# Patient Record
Sex: MALE | Race: BLACK OR AFRICAN AMERICAN | NOT HISPANIC OR LATINO | URBAN - METROPOLITAN AREA
[De-identification: names, ages, dates, MRNs, and addresses within clinical notes are randomized per-mention and may not be internally consistent; named-entity substitution may affect disease eponyms.]

---

## 2022-07-07 ENCOUNTER — INPATIENT (INPATIENT)
Facility: HOSPITAL | Age: 37
LOS: 2 days | Discharge: LEFT BEFORE DISCHARGE | DRG: 975 | End: 2022-07-10
Attending: INTERNAL MEDICINE | Admitting: STUDENT IN AN ORGANIZED HEALTH CARE EDUCATION/TRAINING PROGRAM
Payer: MEDICAID

## 2022-07-07 VITALS
OXYGEN SATURATION: 98 % | RESPIRATION RATE: 18 BRPM | SYSTOLIC BLOOD PRESSURE: 97 MMHG | HEART RATE: 101 BPM | TEMPERATURE: 103 F | DIASTOLIC BLOOD PRESSURE: 40 MMHG

## 2022-07-07 PROCEDURE — 99285 EMERGENCY DEPT VISIT HI MDM: CPT

## 2022-07-07 RX ORDER — SODIUM CHLORIDE 9 MG/ML
2200 INJECTION INTRAMUSCULAR; INTRAVENOUS; SUBCUTANEOUS ONCE
Refills: 0 | Status: COMPLETED | OUTPATIENT
Start: 2022-07-07 | End: 2022-07-07

## 2022-07-07 RX ORDER — CEFTRIAXONE 500 MG/1
1000 INJECTION, POWDER, FOR SOLUTION INTRAMUSCULAR; INTRAVENOUS ONCE
Refills: 0 | Status: COMPLETED | OUTPATIENT
Start: 2022-07-07 | End: 2022-07-07

## 2022-07-07 RX ORDER — VANCOMYCIN HCL 1 G
1000 VIAL (EA) INTRAVENOUS ONCE
Refills: 0 | Status: COMPLETED | OUTPATIENT
Start: 2022-07-07 | End: 2022-07-07

## 2022-07-07 NOTE — ED ADULT TRIAGE NOTE - CHIEF COMPLAINT QUOTE
pt with lower abdominal  pain worsening x 1 week with belching and dry heaving. received 4mg zofran by EMS. hx of HIV

## 2022-07-07 NOTE — ED PROVIDER NOTE - NS_ ATTENDINGSCRIBEDETAILS _ED_A_ED_FT
Yanet Davis MD,  (Attending): The history, relevant review of systems, past medical and surgical history, medical decision making, and physical examination was documented by the scribe in my presence and I attest to the accuracy of the documentation. Yanet Davis MD,  (Attending): The history, relevant review of systems, past medical and surgical history, medical decision making, and physical examination was documented by the scribe in my presence and I attest to the accuracy of the documentation..

## 2022-07-07 NOTE — ED PROVIDER NOTE - PHYSICAL EXAMINATION
***GEN - NAD; well appearing; A+O x3   ***HEAD - NC/AT   ***EYES/NOSE - PEERL, EOMI, mucous membranes moist, no discharge   ***THROAT: Oral cavity and pharynx normal. No inflammation, swelling, exudate, or lesions.    ***NECK: Neck supple, non-tender without lymphadenopathy, no masses, no thyromegaly.   ***PULMONARY - CTA b/l, symmetric breath sounds.   ***CARDIAC -s1s2, RRR, no M,G,R  ***ABDOMEN - +BS, ND, soft, (+)abdomen diffusely tender. No guarding, no rebound, no masses   ***BACK - (+)b/l CVA tenderness, Normal  spine   ***EXTREMITIES - symmetric pulses, 2+ dp, capillary refill < 2 seconds, no clubbing, no cyanosis, no edema   ***SKIN - no rash or bruising. (+)Warm to touch.   ***NEUROLOGIC - alert, CN 2-12 intact, reflexes nl, sensation nl, coordination nl, finger to nose nl, romberg negative, motor 5/5 RUE/LUE/RLE/LLE/EHL/Plantar flexion, no pronator drift, gait nl,   ***PSYCH - insight and judgment nl, memory nl, affect nl, thought nl

## 2022-07-07 NOTE — ED PROVIDER NOTE - OBJECTIVE STATEMENT
35 y/o male with a PMHx of HIV, presents to the ED c/o abdominal pain and dysuria for 1 week. Pt states he had similar symptoms previously. Reports decreased PO intake. Denies vomiting, diarrhea, cough, sore throat, or runny nose. 37 y/o male with a PMHx of HIV, presents to the ED c/o abdominal pain and dysuria for 1 week. Pt states he previously had similar symptoms. Reports decreased PO intake. Denies vomiting, diarrhea, cough, sore throat, or runny nose. 35 y/o male with a PMHx of HIV, presents to the ED c/o abdominal pain and dysuria for 1 week. Pt states he previously had similar symptoms. Reports decreased PO intake. Denies vomiting, diarrhea, cough, sore throat, or runny nose. Allergy to Penicillin.

## 2022-07-08 DIAGNOSIS — R10.9 UNSPECIFIED ABDOMINAL PAIN: ICD-10-CM

## 2022-07-08 LAB
4/8 RATIO: 0.3 RATIO — LOW (ref 0.9–3.6)
ABS CD8: 755 /UL — HIGH (ref 142–740)
ALBUMIN SERPL ELPH-MCNC: 2.8 G/DL — LOW (ref 3.3–5.2)
ALP SERPL-CCNC: 130 U/L — HIGH (ref 40–120)
ALT FLD-CCNC: 94 U/L — HIGH
AMPHET UR-MCNC: POSITIVE
ANION GAP SERPL CALC-SCNC: 12 MMOL/L — SIGNIFICANT CHANGE UP (ref 5–17)
APPEARANCE UR: CLEAR — SIGNIFICANT CHANGE UP
APTT BLD: 30.3 SEC — SIGNIFICANT CHANGE UP (ref 27.5–35.5)
AST SERPL-CCNC: 121 U/L — HIGH
BARBITURATES UR SCN-MCNC: NEGATIVE — SIGNIFICANT CHANGE UP
BASOPHILS # BLD AUTO: 0.03 K/UL — SIGNIFICANT CHANGE UP (ref 0–0.2)
BASOPHILS NFR BLD AUTO: 0.2 % — SIGNIFICANT CHANGE UP (ref 0–2)
BENZODIAZ UR-MCNC: NEGATIVE — SIGNIFICANT CHANGE UP
BILIRUB SERPL-MCNC: <0.2 MG/DL — LOW (ref 0.4–2)
BILIRUB UR-MCNC: NEGATIVE — SIGNIFICANT CHANGE UP
BUN SERPL-MCNC: 16.9 MG/DL — SIGNIFICANT CHANGE UP (ref 8–20)
CALCIUM SERPL-MCNC: 8.3 MG/DL — LOW (ref 8.6–10.2)
CD3 BLASTS SPEC-ACNC: 1033 /UL — SIGNIFICANT CHANGE UP (ref 672–1870)
CD3 BLASTS SPEC-ACNC: 61 % — SIGNIFICANT CHANGE UP (ref 59–83)
CD4 %: 13 % — LOW (ref 30–62)
CD8 %: 44 % — HIGH (ref 12–36)
CHLORIDE SERPL-SCNC: 97 MMOL/L — LOW (ref 98–107)
CO2 SERPL-SCNC: 22 MMOL/L — SIGNIFICANT CHANGE UP (ref 22–29)
COCAINE METAB.OTHER UR-MCNC: NEGATIVE — SIGNIFICANT CHANGE UP
COLOR SPEC: YELLOW — SIGNIFICANT CHANGE UP
CREAT SERPL-MCNC: 1.17 MG/DL — SIGNIFICANT CHANGE UP (ref 0.5–1.3)
DIFF PNL FLD: ABNORMAL
EGFR: 84 ML/MIN/1.73M2 — SIGNIFICANT CHANGE UP
EOSINOPHIL # BLD AUTO: 0.01 K/UL — SIGNIFICANT CHANGE UP (ref 0–0.5)
EOSINOPHIL NFR BLD AUTO: 0.1 % — SIGNIFICANT CHANGE UP (ref 0–6)
FLUAV AG NPH QL: SIGNIFICANT CHANGE UP
FLUBV AG NPH QL: SIGNIFICANT CHANGE UP
GAS PNL BLDV: SIGNIFICANT CHANGE UP
GAS PNL BLDV: SIGNIFICANT CHANGE UP
GLUCOSE SERPL-MCNC: 110 MG/DL — HIGH (ref 70–99)
GLUCOSE UR QL: NEGATIVE MG/DL — SIGNIFICANT CHANGE UP
HCT VFR BLD CALC: 29.9 % — LOW (ref 39–50)
HGB BLD-MCNC: 9.5 G/DL — LOW (ref 13–17)
IMM GRANULOCYTES NFR BLD AUTO: 0.3 % — SIGNIFICANT CHANGE UP (ref 0–1.5)
INR BLD: 1.19 RATIO — HIGH (ref 0.88–1.16)
KETONES UR-MCNC: NEGATIVE — SIGNIFICANT CHANGE UP
LEUKOCYTE ESTERASE UR-ACNC: ABNORMAL
LYMPHOCYTES # BLD AUTO: 16.4 % — SIGNIFICANT CHANGE UP (ref 13–44)
LYMPHOCYTES # BLD AUTO: 2.05 K/UL — SIGNIFICANT CHANGE UP (ref 1–3.3)
MCHC RBC-ENTMCNC: 26.8 PG — LOW (ref 27–34)
MCHC RBC-ENTMCNC: 31.8 GM/DL — LOW (ref 32–36)
MCV RBC AUTO: 84.5 FL — SIGNIFICANT CHANGE UP (ref 80–100)
METHADONE UR-MCNC: NEGATIVE — SIGNIFICANT CHANGE UP
MONOCYTES # BLD AUTO: 1.32 K/UL — HIGH (ref 0–0.9)
MONOCYTES NFR BLD AUTO: 10.6 % — SIGNIFICANT CHANGE UP (ref 2–14)
NEUTROPHILS # BLD AUTO: 9.05 K/UL — HIGH (ref 1.8–7.4)
NEUTROPHILS NFR BLD AUTO: 72.4 % — SIGNIFICANT CHANGE UP (ref 43–77)
NITRITE UR-MCNC: NEGATIVE — SIGNIFICANT CHANGE UP
OPIATES UR-MCNC: NEGATIVE — SIGNIFICANT CHANGE UP
PCP SPEC-MCNC: SIGNIFICANT CHANGE UP
PCP UR-MCNC: NEGATIVE — SIGNIFICANT CHANGE UP
PH UR: 7 — SIGNIFICANT CHANGE UP (ref 5–8)
PLATELET # BLD AUTO: 362 K/UL — SIGNIFICANT CHANGE UP (ref 150–400)
POTASSIUM SERPL-MCNC: 4.2 MMOL/L — SIGNIFICANT CHANGE UP (ref 3.5–5.3)
POTASSIUM SERPL-SCNC: 4.2 MMOL/L — SIGNIFICANT CHANGE UP (ref 3.5–5.3)
PROT SERPL-MCNC: 7.2 G/DL — SIGNIFICANT CHANGE UP (ref 6.6–8.7)
PROT UR-MCNC: 15
PROTHROM AB SERPL-ACNC: 13.8 SEC — HIGH (ref 10.5–13.4)
RBC # BLD: 3.54 M/UL — LOW (ref 4.2–5.8)
RBC # FLD: 14.8 % — HIGH (ref 10.3–14.5)
RSV RNA NPH QL NAA+NON-PROBE: SIGNIFICANT CHANGE UP
SARS-COV-2 RNA SPEC QL NAA+PROBE: SIGNIFICANT CHANGE UP
SODIUM SERPL-SCNC: 130 MMOL/L — LOW (ref 135–145)
SP GR SPEC: 1.01 — SIGNIFICANT CHANGE UP (ref 1.01–1.02)
T-CELL CD4 SUBSET PNL BLD: 230 /UL — LOW (ref 489–1457)
THC UR QL: NEGATIVE — SIGNIFICANT CHANGE UP
UROBILINOGEN FLD QL: NEGATIVE MG/DL — SIGNIFICANT CHANGE UP
WBC # BLD: 12.5 K/UL — HIGH (ref 3.8–10.5)
WBC # FLD AUTO: 12.5 K/UL — HIGH (ref 3.8–10.5)

## 2022-07-08 PROCEDURE — 99223 1ST HOSP IP/OBS HIGH 75: CPT

## 2022-07-08 PROCEDURE — G1004: CPT

## 2022-07-08 PROCEDURE — 74177 CT ABD & PELVIS W/CONTRAST: CPT | Mod: 26,MG

## 2022-07-08 PROCEDURE — 93010 ELECTROCARDIOGRAM REPORT: CPT

## 2022-07-08 PROCEDURE — 76705 ECHO EXAM OF ABDOMEN: CPT | Mod: 26

## 2022-07-08 PROCEDURE — 71045 X-RAY EXAM CHEST 1 VIEW: CPT | Mod: 26

## 2022-07-08 RX ORDER — CEFTRIAXONE 500 MG/1
1000 INJECTION, POWDER, FOR SOLUTION INTRAMUSCULAR; INTRAVENOUS ONCE
Refills: 0 | Status: COMPLETED | OUTPATIENT
Start: 2022-07-08 | End: 2022-07-08

## 2022-07-08 RX ORDER — ATORVASTATIN CALCIUM 80 MG/1
1 TABLET, FILM COATED ORAL
Qty: 0 | Refills: 0 | DISCHARGE

## 2022-07-08 RX ORDER — ONDANSETRON 8 MG/1
4 TABLET, FILM COATED ORAL EVERY 8 HOURS
Refills: 0 | Status: DISCONTINUED | OUTPATIENT
Start: 2022-07-08 | End: 2022-07-10

## 2022-07-08 RX ORDER — CEFTRIAXONE 500 MG/1
1000 INJECTION, POWDER, FOR SOLUTION INTRAMUSCULAR; INTRAVENOUS EVERY 24 HOURS
Refills: 0 | Status: DISCONTINUED | OUTPATIENT
Start: 2022-07-08 | End: 2022-07-10

## 2022-07-08 RX ORDER — HYDROMORPHONE HYDROCHLORIDE 2 MG/ML
1 INJECTION INTRAMUSCULAR; INTRAVENOUS; SUBCUTANEOUS ONCE
Refills: 0 | Status: DISCONTINUED | OUTPATIENT
Start: 2022-07-08 | End: 2022-07-08

## 2022-07-08 RX ORDER — LANOLIN ALCOHOL/MO/W.PET/CERES
3 CREAM (GRAM) TOPICAL AT BEDTIME
Refills: 0 | Status: DISCONTINUED | OUTPATIENT
Start: 2022-07-08 | End: 2022-07-10

## 2022-07-08 RX ORDER — BICTEGRAVIR SODIUM, EMTRICITABINE, AND TENOFOVIR ALAFENAMIDE FUMARATE 30; 120; 15 MG/1; MG/1; MG/1
1 TABLET ORAL
Qty: 0 | Refills: 0 | DISCHARGE

## 2022-07-08 RX ORDER — ACETAMINOPHEN 500 MG
650 TABLET ORAL EVERY 6 HOURS
Refills: 0 | Status: DISCONTINUED | OUTPATIENT
Start: 2022-07-08 | End: 2022-07-10

## 2022-07-08 RX ORDER — BICTEGRAVIR SODIUM, EMTRICITABINE, AND TENOFOVIR ALAFENAMIDE FUMARATE 30; 120; 15 MG/1; MG/1; MG/1
1 TABLET ORAL DAILY
Refills: 0 | Status: DISCONTINUED | OUTPATIENT
Start: 2022-07-08 | End: 2022-07-10

## 2022-07-08 RX ORDER — HEPARIN SODIUM 5000 [USP'U]/ML
5000 INJECTION INTRAVENOUS; SUBCUTANEOUS EVERY 8 HOURS
Refills: 0 | Status: DISCONTINUED | OUTPATIENT
Start: 2022-07-08 | End: 2022-07-10

## 2022-07-08 RX ADMIN — BICTEGRAVIR SODIUM, EMTRICITABINE, AND TENOFOVIR ALAFENAMIDE FUMARATE 1 TABLET(S): 30; 120; 15 TABLET ORAL at 10:51

## 2022-07-08 RX ADMIN — CEFTRIAXONE 100 MILLIGRAM(S): 500 INJECTION, POWDER, FOR SOLUTION INTRAMUSCULAR; INTRAVENOUS at 09:25

## 2022-07-08 RX ADMIN — Medication 250 MILLIGRAM(S): at 05:03

## 2022-07-08 RX ADMIN — Medication 650 MILLIGRAM(S): at 17:35

## 2022-07-08 RX ADMIN — Medication 1000 MILLIGRAM(S): at 07:52

## 2022-07-08 RX ADMIN — HEPARIN SODIUM 5000 UNIT(S): 5000 INJECTION INTRAVENOUS; SUBCUTANEOUS at 23:20

## 2022-07-08 RX ADMIN — HYDROMORPHONE HYDROCHLORIDE 1 MILLIGRAM(S): 2 INJECTION INTRAMUSCULAR; INTRAVENOUS; SUBCUTANEOUS at 06:30

## 2022-07-08 RX ADMIN — HYDROMORPHONE HYDROCHLORIDE 1 MILLIGRAM(S): 2 INJECTION INTRAMUSCULAR; INTRAVENOUS; SUBCUTANEOUS at 03:55

## 2022-07-08 RX ADMIN — CEFTRIAXONE 1000 MILLIGRAM(S): 500 INJECTION, POWDER, FOR SOLUTION INTRAMUSCULAR; INTRAVENOUS at 10:51

## 2022-07-08 RX ADMIN — CEFTRIAXONE 100 MILLIGRAM(S): 500 INJECTION, POWDER, FOR SOLUTION INTRAMUSCULAR; INTRAVENOUS at 00:56

## 2022-07-08 RX ADMIN — CEFTRIAXONE 100 MILLIGRAM(S): 500 INJECTION, POWDER, FOR SOLUTION INTRAMUSCULAR; INTRAVENOUS at 11:53

## 2022-07-08 RX ADMIN — HYDROMORPHONE HYDROCHLORIDE 1 MILLIGRAM(S): 2 INJECTION INTRAMUSCULAR; INTRAVENOUS; SUBCUTANEOUS at 04:55

## 2022-07-08 RX ADMIN — SODIUM CHLORIDE 1100 MILLILITER(S): 9 INJECTION INTRAMUSCULAR; INTRAVENOUS; SUBCUTANEOUS at 00:57

## 2022-07-08 RX ADMIN — HYDROMORPHONE HYDROCHLORIDE 1 MILLIGRAM(S): 2 INJECTION INTRAMUSCULAR; INTRAVENOUS; SUBCUTANEOUS at 07:26

## 2022-07-08 NOTE — ED ADULT NURSE NOTE - OBJECTIVE STATEMENT
pt with lower abdominal  pain worsening x 1 week with belching and dry heaving. received 4mg zofran by EMS. hx of HIV.

## 2022-07-08 NOTE — H&P ADULT - VTE RISK ASSESSMENT
Problem: Risk for Impaired Skin Integrity  Goal: Tissue integrity - skin and mucous membranes  Description  Structural intactness and normal physiological function of skin and  mucous membranes.   Outcome: Ongoing     Problem: Falls - Risk of:  Goal: Will remain free from falls  Description  Will remain free from falls  2/10/2020 1125 by Yonathan Ventura RN  Outcome: Ongoing VTE Assessment already completed for this visit

## 2022-07-08 NOTE — ED ADULT NURSE NOTE - PATIENT IS UNABLE TO BE SCREENED DUE TO:
Health maintenance - flu vacc refused, Prevnar 2/16, PVX 11/17, tdap 7/12, rec hep A and shingrix - get at pharmacy; mammo 2/1/18 [facility]; no further Paps unless abnlities; DEXA 11/17, repeat 2020-21; colonsoc 2/14, repeat 2024 per Dr. Peoples; eye exam with Dr. Francis to be updated; dental exam q6mos; (+) seat belt use; needs annual skin check with Dr. Baer (with h/o BCC and recent SCC)    Consultants:  Patient Care Team:  Nicole Arana MD as PCP - General (Internal Medicine)  Alexa Muro PA as PCP - Claims Attributed  Richelle, Surinder KRISHNAMURTHY MD as Consulting Physician (Colon and Rectal Surgery)  Fransico Juarez MD as Consulting Physician (Obstetrics and Gynecology)  Surinder Fontaine MD as Consulting Physician (Infectious Diseases)  Fransico Kimble MD as Consulting Physician (Dermatology)  Chay Francis III, MD as Consulting Physician (Ophthalmology)  Kimberley Baer MD as Consulting Physician (Dermatology)  Sid Infante MD as Consulting Physician (Dermatology)       Acuity of illness

## 2022-07-08 NOTE — H&P ADULT - NSHPREVIEWOFSYSTEMS_GEN_ALL_CORE
Review of Systems:  CONSTITUTIONAL: + fevers and chills  EYES/ENT: No visual changes;  No vertigo or throat pain   NECK: No pain or stiffness  RESPIRATORY: No cough, wheezing, hemoptysis; No shortness of breath,   CARDIOVASCULAR: No chest pain or palpitations  GASTROINTESTINAL: No abdominal or epigastric pain. No nausea, vomiting, or hematemesis; No diarrhea or constipation.   GENITOURINARY: +Dysuria  NEUROLOGICAL: No numbness or weakness  SKIN: No itching, burning, rashes, or lesions   All other review of systems is negative unless indicated above

## 2022-07-08 NOTE — H&P ADULT - HISTORY OF PRESENT ILLNESS
37 yo male with hx of HIV presents with complaints of lower abdominal pain for the past week. Patient states that he has been at staying at [place] and started to develop abdominal pain. States to have subjective fevers and chills at home. Of note patient also reports some dysuria as well. Denies currently being sexually active. Denies any discharge. No other complaints.

## 2022-07-08 NOTE — H&P ADULT - ASSESSMENT
35 yo male with hx of HIV presents with complaints of lower abdominal pain for the past week. Patient states that he has been at staying at [place] and started to develop abdominal pain. States to have subjective fevers and chills at home. Of note patient also reports some dysuria as well. Denies currently being sexually active. Denies any discharge. No other complaints.    #Sepsis secondary to Pyelonephritis  CT scan results reviewed   UA positive  F/u UCX, Blood cx  Requested STD testing   Will send GC/CT and RPR  CXR appears negative    #HIV  On Biktaryv  Unaware of CD4/VL - Will order labs  States to be compliant with biktarvy    #Anemia  FOBT  Iron panel, b12, folate  TSH  Monitor    #Rectal wall thickening   CT abdomen with diffuse rectal wall thickening  Needs to follow up with GI outpatient for colonoscopy     #DVT prophylaxis: Heparin     37 yo male with hx of HIV presents with complaints of lower abdominal pain for the past week. Patient states that he has been at staying at [place] and started to develop abdominal pain. States to have subjective fevers and chills at home. Of note patient also reports some dysuria as well. Denies currently being sexually active. Denies any discharge. No other complaints.    #Sepsis secondary to Pyelonephritis  CT scan results reviewed   UA positive  F/u UCX, Blood cx  Requested STD testing   Will send GC/CT and RPR  CXR appears negative    #HIV  On Biktaryv  Unaware of CD4/VL - Will order labs  States to be compliant with biktarvy    #Anemia  FOBT  Iron panel, b12, folate  TSH  Monitor    #Rectal wall thickening   CT abdomen with diffuse rectal wall thickening  Needs to follow up with GI outpatient for colonoscopy     #Elevated LFTs  Will obtain RUQ US  Hepatitis panel    #DVT prophylaxis: Heparin

## 2022-07-08 NOTE — H&P ADULT - NSHPPHYSICALEXAM_GEN_ALL_CORE
PHYSICAL EXAM:  Vital Signs Last 24 Hrs  T(F): 99.2 (08 Jul 2022 04:42), Max: 103 (07 Jul 2022 22:03)  HR: 77 (08 Jul 2022 04:42) (77 - 101)  BP: 110/60 (08 Jul 2022 04:42) (97/40 - 110/60)  RR: 18 (07 Jul 2022 22:03) (18 - 18)  SpO2: 97% (08 Jul 2022 04:42) (97% - 98%)    GENERAL: NAD, Resting in bed  Eyes: EOMI, PERRLA  ENMT: Conjunctiva and sclera clear; supple neck, No JVD  Cardiovascular: S1,S2, RRR, No murmur  Respiratory: CTA B/L, Non-labored breathing  GI: Bowel sounds present; Soft, Nontender, Nondistended. No hepatomegaly  Genitourinary: Deferred  Skin:  no breakdowns, ulcers or discharge, No rashes or lesions  Neurology: Alert & Oriented X3, non-focal and spontaneous movements of all extremities, CN 2 to 12 grossly intact   Psych: Appropriate mood and affect, calm, pleasant, Responds appropriately to questions

## 2022-07-09 LAB
A1C WITH ESTIMATED AVERAGE GLUCOSE RESULT: 5.5 % — SIGNIFICANT CHANGE UP (ref 4–5.6)
ALBUMIN SERPL ELPH-MCNC: 2.9 G/DL — LOW (ref 3.3–5.2)
ALP SERPL-CCNC: 146 U/L — HIGH (ref 40–120)
ALT FLD-CCNC: 93 U/L — HIGH
ANION GAP SERPL CALC-SCNC: 14 MMOL/L — SIGNIFICANT CHANGE UP (ref 5–17)
AST SERPL-CCNC: 82 U/L — HIGH
BASOPHILS # BLD AUTO: 0.02 K/UL — SIGNIFICANT CHANGE UP (ref 0–0.2)
BASOPHILS NFR BLD AUTO: 0.2 % — SIGNIFICANT CHANGE UP (ref 0–2)
BILIRUB SERPL-MCNC: <0.2 MG/DL — LOW (ref 0.4–2)
BUN SERPL-MCNC: 10.4 MG/DL — SIGNIFICANT CHANGE UP (ref 8–20)
C TRACH RRNA SPEC QL NAA+PROBE: SIGNIFICANT CHANGE UP
C TRACH RRNA SPEC QL NAA+PROBE: SIGNIFICANT CHANGE UP
CALCIUM SERPL-MCNC: 8.3 MG/DL — LOW (ref 8.6–10.2)
CHLORIDE SERPL-SCNC: 100 MMOL/L — SIGNIFICANT CHANGE UP (ref 98–107)
CHOLEST SERPL-MCNC: 80 MG/DL — SIGNIFICANT CHANGE UP
CO2 SERPL-SCNC: 22 MMOL/L — SIGNIFICANT CHANGE UP (ref 22–29)
CREAT SERPL-MCNC: 0.94 MG/DL — SIGNIFICANT CHANGE UP (ref 0.5–1.3)
E COLI DNA BLD POS QL NAA+NON-PROBE: SIGNIFICANT CHANGE UP
EGFR: 109 ML/MIN/1.73M2 — SIGNIFICANT CHANGE UP
EOSINOPHIL # BLD AUTO: 0.05 K/UL — SIGNIFICANT CHANGE UP (ref 0–0.5)
EOSINOPHIL NFR BLD AUTO: 0.4 % — SIGNIFICANT CHANGE UP (ref 0–6)
ESTIMATED AVERAGE GLUCOSE: 111 MG/DL — SIGNIFICANT CHANGE UP (ref 68–114)
FERRITIN SERPL-MCNC: 303 NG/ML — SIGNIFICANT CHANGE UP (ref 30–400)
FOLATE SERPL-MCNC: 8.7 NG/ML — SIGNIFICANT CHANGE UP
GLUCOSE SERPL-MCNC: 126 MG/DL — HIGH (ref 70–99)
GRAM STN FLD: SIGNIFICANT CHANGE UP
HCT VFR BLD CALC: 33.8 % — LOW (ref 39–50)
HDLC SERPL-MCNC: 19 MG/DL — LOW
HGB BLD-MCNC: 10.8 G/DL — LOW (ref 13–17)
HIV-1 VIRAL LOAD RESULT: ABNORMAL
HIV1 RNA # SERPL NAA+PROBE: ABNORMAL COPIES/ML
HIV1 RNA SER-IMP: SIGNIFICANT CHANGE UP
HIV1 RNA SERPL NAA+PROBE-ACNC: ABNORMAL
HIV1 RNA SERPL NAA+PROBE-LOG#: ABNORMAL LG COP/ML
IMM GRANULOCYTES NFR BLD AUTO: 0.4 % — SIGNIFICANT CHANGE UP (ref 0–1.5)
IRON SATN MFR SERPL: 15 UG/DL — LOW (ref 59–158)
IRON SATN MFR SERPL: 6 % — LOW (ref 16–55)
LIPID PNL WITH DIRECT LDL SERPL: 49 MG/DL — SIGNIFICANT CHANGE UP
LYMPHOCYTES # BLD AUTO: 1.18 K/UL — SIGNIFICANT CHANGE UP (ref 1–3.3)
LYMPHOCYTES # BLD AUTO: 10 % — LOW (ref 13–44)
MCHC RBC-ENTMCNC: 27.3 PG — SIGNIFICANT CHANGE UP (ref 27–34)
MCHC RBC-ENTMCNC: 32 GM/DL — SIGNIFICANT CHANGE UP (ref 32–36)
MCV RBC AUTO: 85.6 FL — SIGNIFICANT CHANGE UP (ref 80–100)
METHOD TYPE: SIGNIFICANT CHANGE UP
MONOCYTES # BLD AUTO: 0.77 K/UL — SIGNIFICANT CHANGE UP (ref 0–0.9)
MONOCYTES NFR BLD AUTO: 6.5 % — SIGNIFICANT CHANGE UP (ref 2–14)
N GONORRHOEA RRNA SPEC QL NAA+PROBE: SIGNIFICANT CHANGE UP
N GONORRHOEA RRNA SPEC QL NAA+PROBE: SIGNIFICANT CHANGE UP
NEUTROPHILS # BLD AUTO: 9.76 K/UL — HIGH (ref 1.8–7.4)
NEUTROPHILS NFR BLD AUTO: 82.5 % — HIGH (ref 43–77)
NON HDL CHOLESTEROL: 61 MG/DL — SIGNIFICANT CHANGE UP
PLATELET # BLD AUTO: 450 K/UL — HIGH (ref 150–400)
POTASSIUM SERPL-MCNC: 4 MMOL/L — SIGNIFICANT CHANGE UP (ref 3.5–5.3)
POTASSIUM SERPL-SCNC: 4 MMOL/L — SIGNIFICANT CHANGE UP (ref 3.5–5.3)
PROT SERPL-MCNC: 7.1 G/DL — SIGNIFICANT CHANGE UP (ref 6.6–8.7)
RBC # BLD: 3.95 M/UL — LOW (ref 4.2–5.8)
RBC # FLD: 15 % — HIGH (ref 10.3–14.5)
SODIUM SERPL-SCNC: 136 MMOL/L — SIGNIFICANT CHANGE UP (ref 135–145)
SPECIMEN SOURCE: SIGNIFICANT CHANGE UP
SPECIMEN SOURCE: SIGNIFICANT CHANGE UP
TIBC SERPL-MCNC: 236 UG/DL — SIGNIFICANT CHANGE UP (ref 220–430)
TRANSFERRIN SERPL-MCNC: 165 MG/DL — LOW (ref 180–329)
TRIGL SERPL-MCNC: 62 MG/DL — SIGNIFICANT CHANGE UP
TSH SERPL-MCNC: 0.79 UIU/ML — SIGNIFICANT CHANGE UP (ref 0.27–4.2)
VIT B12 SERPL-MCNC: 269 PG/ML — SIGNIFICANT CHANGE UP (ref 232–1245)
WBC # BLD: 11.83 K/UL — HIGH (ref 3.8–10.5)
WBC # FLD AUTO: 11.83 K/UL — HIGH (ref 3.8–10.5)

## 2022-07-09 PROCEDURE — 99233 SBSQ HOSP IP/OBS HIGH 50: CPT

## 2022-07-09 PROCEDURE — 99252 IP/OBS CONSLTJ NEW/EST SF 35: CPT

## 2022-07-09 PROCEDURE — 99221 1ST HOSP IP/OBS SF/LOW 40: CPT

## 2022-07-09 RX ORDER — MORPHINE SULFATE 50 MG/1
1 CAPSULE, EXTENDED RELEASE ORAL ONCE
Refills: 0 | Status: DISCONTINUED | OUTPATIENT
Start: 2022-07-09 | End: 2022-07-09

## 2022-07-09 RX ADMIN — HEPARIN SODIUM 5000 UNIT(S): 5000 INJECTION INTRAVENOUS; SUBCUTANEOUS at 05:26

## 2022-07-09 RX ADMIN — CEFTRIAXONE 100 MILLIGRAM(S): 500 INJECTION, POWDER, FOR SOLUTION INTRAMUSCULAR; INTRAVENOUS at 12:13

## 2022-07-09 RX ADMIN — HEPARIN SODIUM 5000 UNIT(S): 5000 INJECTION INTRAVENOUS; SUBCUTANEOUS at 22:41

## 2022-07-09 RX ADMIN — BICTEGRAVIR SODIUM, EMTRICITABINE, AND TENOFOVIR ALAFENAMIDE FUMARATE 1 TABLET(S): 30; 120; 15 TABLET ORAL at 12:13

## 2022-07-09 RX ADMIN — MORPHINE SULFATE 1 MILLIGRAM(S): 50 CAPSULE, EXTENDED RELEASE ORAL at 21:45

## 2022-07-09 RX ADMIN — MORPHINE SULFATE 1 MILLIGRAM(S): 50 CAPSULE, EXTENDED RELEASE ORAL at 20:45

## 2022-07-09 NOTE — CONSULT NOTE ADULT - ASSESSMENT
This 37 yo male with hx of HIV presents with complaints of lower abdominal pain for the past week. Patient states that he has been at staying at [place] and started to develop abdominal pain. States to have subjective fevers and chills at home. Of note patient also reports some dysuria as well. Denies currently being sexually active. Denies any discharge. No other complaints. (08 Jul 2022 09:34)    patient has been having lower abd pain for 1 week,. taking Tylenol intermittently.  He reported subjective fevers and chills at home.,   He also had lower back pain. Urine was darker and strong smelling.     He is sexually active with other men.  No recent partners in about 1 month.     He is HIV positive, unsure how long ago he was diagnosed - maybe 5 to 10 years ago.  He was on meds earlier in the course, then stopped. Finally re-establish care with a doctor in [place].       Impression:  E coli bacteremia  pyelonephritis  WBC elevation  HIV disease      Plan:  - E coli bacteremia with likely bilateral LEFT > RIGHT pyelonephritis (base on clinical exam),   findings on CT scan reviewed.     patient currently denies sexual activity, but previously MSM.     - continue Ceftriaxone.   repeat blood cultures were not yet obtained.  They have been ordered at time of consultation.     - follow up all outstanding cultures  - trend temperature and WBC curve  - repeat cultures from blood and all sources if febrile.    Awaiting E coli susceptibility  - choice of oral antibiotics limited for pyelonephritis.   but POTENTIALLY, can go home on Levaquin 750mg PO daily x 10-14 days;   If he chooses to leave against medical advice, please give him an Rx for Levaquin.     WBC elevation is reactive  - will follow and trend    HIV disease  - uncertain of compliance with Biktarvy  - his CD4 is at   ABS CD4: 230 /uL  awaiting HIV viral load.   - he can follow up with primary ID doc in the community.   continue Biktarvy.         Will follow while he's admitted.

## 2022-07-09 NOTE — PATIENT PROFILE ADULT - FALL HARM RISK - UNIVERSAL INTERVENTIONS
Bed in lowest position, wheels locked, appropriate side rails in place/Call bell, personal items and telephone in reach/Instruct patient to call for assistance before getting out of bed or chair/Non-slip footwear when patient is out of bed/[place] to call system/Physically safe environment - no spills, clutter or unnecessary equipment/Purposeful Proactive Rounding/Room/bathroom lighting operational, light cord in reach

## 2022-07-09 NOTE — CONSULT NOTE ADULT - SUBJECTIVE AND OBJECTIVE BOX
[facility]                                                INFECTIOUS DISEASES  =======================================================                               Emile Moreno MD#  Carlton Arauz MD*                                     Tamica Cline MD*    Maria Teresa Brandt MD*            Diplomates American Board of Internal Medicine & Infectious Diseases                  # [place] Office - Appt - Tel  [phone] Fax [phone]                * [employer] Office - Appt - Tel [phone] Fax [phone]                                  Hospital Consult line:  [phone]  =======================================================      [id number]  MELANIE GRACE   HPI: This 37 yo male with hx of HIV presents with complaints of lower abdominal pain for the past week. Patient states that he has been at staying at [facility] and started to develop abdominal pain. States to have subjective fevers and chills at home. Of note patient also reports some dysuria as well. Denies currently being sexually active. Denies any discharge. No other complaints. (08 Jul 2022 09:34)    patient has been having lower abd pain for 1 week,. taking tylenol intermittently.  He reported subjective fevers and chills at home.,   He also had lower back pain. Urine was darker and strong smelling.     He is sexually active with other men.  No recent partners in about 1 month.     He is HIV positive, unsure how long ago he was diagnosed - maybe 5 to 10 years ago.  He was on meds earlier in the course, then stopped. Finally re-establish care with a doctor in [place].       I have personally reviewed the labs and data; pertinent labs and data are listed in this note; please see below.   =======================================================  Past Medical & Surgical Hx:  =====================  PAST MEDICAL & SURGICAL HISTORY:  HIV disease       Problem List:  ==========  HEALTH ISSUES - PROBLEM Dx:        Social Hx:  =======  no toxic habits currently    FAMILY HISTORY:  No pertinent family history in first degree relatives    no significant family history of immunosuppressive disorders in mother or father   =======================================================    REVIEW OF SYSTEMS:  CONSTITUTIONAL:  No Fever or chills  HEENT:  No diplopia or blurred vision.  No earache, sore throat or runny nose.  CARDIOVASCULAR:  No pressure, squeezing, strangling, tightness, heaviness or aching about the chest, neck, axilla or epigastrium.  RESPIRATORY:  No cough, shortness of breath  GASTROINTESTINAL:  No nausea, vomiting or diarrhea.  GENITOURINARY:  as per HPI  MUSCULOSKELETAL:  no joint aches, no muscle pain  SKIN:  No change in skin, hair or nails.  NEUROLOGIC:  No Headaches, seizures or weakness.  PSYCHIATRIC:  No disorder of thought or mood.  ENDOCRINE:  No heat or cold intolerance  HEMATOLOGICAL:  No easy bruising or bleeding.    =======================================================  Allergies  No Known Allergies    Antibiotics:  bictegravir 50 mG/emtricitabine 200 mG/tenofovir alafenamide 25 mG (BIKTARVY) 1 Tablet(s) Oral daily  cefTRIAXone   IVPB 1000 milliGRAM(s) IV Intermittent every 24 hours    Other medications:  heparin   Injectable 5000 Unit(s) SubCutaneous every 8 hours   bictegravir 50 mG/emtricitabine 200 mG/tenofovir alafenamide 25 mG (BIKTARVY)   1 Tablet(s) Oral (07-08-22 @ 10:51)   1 Tablet(s) Oral (07-09-22 @ 12:13)    cefTRIAXone   IVPB   100 mL/Hr IV Intermittent (07-08-22 @ 09:25)    cefTRIAXone   IVPB   100 mL/Hr IV Intermittent (07-08-22 @ 00:56)    cefTRIAXone   IVPB   100 mL/Hr IV Intermittent (07-08-22 @ 11:53)   100 mL/Hr IV Intermittent (07-09-22 @ 12:13)    vancomycin  IVPB   250 mL/Hr IV Intermittent (07-08-22 @ 05:03)      ======================================================  Physical Exam:  ============  T(F): 97 (09 Jul 2022 10:20), Max: 102.1 (08 Jul 2022 17:30)  HR: 65 (09 Jul 2022 10:20)  BP: 103/65 (09 Jul 2022 10:20)  RR: 18 (09 Jul 2022 10:20)  SpO2: 99% (09 Jul 2022 10:20) (97% - 100%)  temp max in last 48H T(F): , Max: 103 (07-07-22 @ 22:03)    General:  No acute distress.  Eye: Pupils are equal, round and reactive to light, Extraocular movements are intact, Normal conjunctiva.  HENT: Normocephalic, Oral mucosa is moist, No pharyngeal erythema, No sinus tenderness.  Neck: Supple, No lymphadenopathy.  Respiratory: Lungs are clear to auscultation, Respirations are non-labored.  Cardiovascular: Normal rate, Regular rhythm,   Gastrointestinal: Soft, Non-tender, Non-distended, Normal bowel sounds.  Genitourinary: LEFT costovertebral angle tenderness. Mild suprapubic tenderness  Lymphatics: No lymphadenopathy neck,   Musculoskeletal: Normal range of motion, Normal strength.  Integumentary: No rash.  Neurologic: Alert, Oriented, No focal deficits, Cranial Nerves II-XII are grossly intact.  Psychiatric: Appropriate mood & affect.    =======================================================  Labs:                        10.8   11.83 )-----------( 450      ( 09 Jul 2022 07:07 )             33.8     WBC Count: 11.83 K/uL (07-09-22 @ 07:07)  WBC Count: 12.50 K/uL (07-08-22 @ 01:04)    07-09    136  |  100  |  10.4  ----------------------------<  126<H>  4.0   |  22.0  |  0.94    Ca    8.3<L>      09 Jul 2022 07:05    TPro  7.1  /  Alb  2.9<L>  /  TBili  <0.2<L>  /  DBili  x   /  AST  82<H>  /  ALT  93<H>  /  AlkPhos  146<H>  07-09      Culture - Blood (collected 07-08-22 @ 09:25)  Source: .Blood Blood-Peripheral    Culture - Blood (collected 07-08-22 @ 09:25)  Source: .Blood Blood-Peripheral  Gram Stain (07-09-22 @ 01:48):    Growth in anaerobic bottle: Gram Negative Rods  Organism: Blood Culture PCR (07-09-22 @ 05:16)  Organism: Blood Culture PCR (07-09-22 @ 05:16)    Sensitivities:      -  Escherichia coli: Detec      Method Type: PCR      Creatinine, Serum: 0.94 mg/dL (07-09-22 @ 07:05)  Creatinine, Serum: 1.17 mg/dL (07-08-22 @ 01:04)         SARS-CoV-2 Result: NotDetec (07-08-22 @ 01:04)     ==========  T Cell Subset (07.08.22 @ 19:44)    CD3 %: 61 %    CD4 %: 13 %    CD8 %: 44 %    4/8 Ratio: 0.30 RATIO    ABS CD3: 1033 /uL    ABS CD4: 230 /uL    ABS CD8: 755 /uL      ===========  < from: CT Abdomen and Pelvis w/ IV Cont (07.08.22 @ 06:13) >    ACC: [id number] EXAM:  CT ABDOMEN AND PELVIS IC                          PROCEDURE DATE:  07/08/2022          INTERPRETATION:  CLINICAL INFORMATION: Abdominal pain.    COMPARISON: None.    CONTRAST/COMPLICATIONS:  IV Contrast: Omnipaque 350  90 cc administered  Oral Contrast: NONE  Complications: None reported at time of study completion    PROCEDURE:  CT of the Abdomen and Pelvis was performed.  Sagittal and coronal reformats were performed.    FINDINGS:  LOWER CHEST: Patchy bibasilar opacities,right greater than left.   Recommend clinical correlation to assess for developing superimposed   infectious inflammatory process. Chest radiographic imaging follow-up is   advised.    LIVER: Within normal limits.  BILE DUCTS: Normal caliber.  GALLBLADDER: Within normal limits.  SPLEEN: Within normal limits.  PANCREAS: Within normal limits.  ADRENALS: Within normal limits.  KIDNEYS/URETERS: Heterogeneously enhancing bilateral kidneys with   striated nephrogram and mild perinephric stranding. Mildfullness of   bilateral renal pelvis. Bilateral ureteral wall thickening/hyperemia   identified.    BLADDER: Diffuse wall thickening difficult to assess secondary to   inadequate distention.  REPRODUCTIVE ORGANS: Prostate within normal limits.    BOWEL: No bowel obstruction. Mild generalized distention. Diffuse rectal   wall thickening difficult to assess secondary to inadequate distention.   This may represent proctitis in appropriate clinical setting. Consider   nonemergent colonoscopy evaluation to exclude underlying malignancy.  PERITONEUM: Mild pelvic free fluid.  VESSELS: Within normal limits.  RETROPERITONEUM/LYMPH NODES: No lymphadenopathy.  ABDOMINAL WALL: Within normal limits.  BONES: Within normal limits.    IMPRESSION:    Heterogeneously enhancing bilateral kidneys with striated nephrogram and mild perinephric stranding. Mild fullness of bilateral renal pelvis. Bilateral ureteral wall thickening/hyperemia identified. Findings concerning for pyelonephritis and ureteritis.    Diffuse wall thickening difficult to assess secondary to inadequate distention. Correlate with urinalysis and lab values to assess for cystitis and/or ascending urinary tract infection.    Diffuse rectal wall thickening difficult to assess secondary to inadequate distention. This may represent proctitis in appropriate clinical setting. Consider nonemergent colonoscopy evaluation to exclude underlying malignancy.    Patchy bibasilar opacities, right greater than left. Recommend clinical correlation to assess for developing superimposed infectious inflammatory process. Chest radiographic imaging follow-up is advised.    --- End of Report ---            JESSICA CHEN MD; Attending Radiologist  This document has been electronically signed. Jul 8 2022  6:31AM    < end of copied text >

## 2022-07-09 NOTE — PROGRESS NOTE ADULT - ASSESSMENT
37 yo male with hx of HIV presents with complaints of lower abdominal pain for the past week. Patient states that he has been at staying at [place] and started to develop abdominal pain. States to have subjective fevers and chills at home. Of note patient also reports some dysuria as well. Denies currently being sexually active. Denies any discharge. No other complaints.  he wants to go home- explained that sits not safe and he risks life if signing out AMA  #Sepsis secondary to Pyelonephritis- bacteremia- gram negative   UA positive  F/u UCX, Blood cx positive gram negative   CXR appears negative    #HIV  On Biktaryv  CD4/VL -  States to be compliant with biktarvy    #Anemia chronic disease- improved 10.8 Hgb  FOBT  monitor      #Rectal wall thickening   CT abdomen with diffuse rectal wall thickening  Needs to follow up with GI outpatient for colonoscopy     #Elevated LFTs  Will obtain RUQ US  Hepatitis panel    #DVT prophylaxis: Heparin

## 2022-07-09 NOTE — PROGRESS NOTE ADULT - SUBJECTIVE AND OBJECTIVE BOX
MELANIE GRACE Patient is a 36y old  Male who presents with a chief complaint of Pyelonephritis (2022 09:34)     HPI:  37 yo male with hx of HIV presents with complaints of lower abdominal pain for the past week. Patient states that he has been at staying at [facility] and started to develop abdominal pain. States to have subjective fevers and chills at home. Of note patient also reports some dysuria as well. Denies currently being sexually active. Denies any discharge. No other complaints. (2022 09:34)    The patient was seen and evaluated   The patient is in no acute distress.  Denied any fever chest pain, palpitations, shortness of breath, abdominal pain, fever, dysuria, cough, edema   Complains of "feeling sick"- wants to go home- discussed the absolute necessity of IV Abx given gram negative bacteremia    I&O's Summary    2022 07:01  -  2022 07:00  --------------------------------------------------------  IN: 950 mL / OUT: 300 mL / NET: 650 mL      Allergies    No Known Allergies    Intolerances      HEALTH ISSUES - PROBLEM Dx:        PAST MEDICAL & SURGICAL HISTORY:  HIV disease              Vital Signs Last 24 Hrs  T(C): 36.1 (2022 10:20), Max: 38.9 (2022 17:30)  T(F): 97 (2022 10:20), Max: 102.1 (2022 17:30)  HR: 65 (2022 10:20) (59 - 89)  BP: 103/65 (2022 10:20) (102/58 - 119/71)  BP(mean): --  RR: 18 (2022 10:20) (18 - 18)  SpO2: 99% (2022 10:20) (97% - 100%)    Parameters below as of 2022 05:50  Patient On (Oxygen Delivery Method): room air    T(C): 36.1 (22 @ 10:20), Max: 38.9 (22 @ 17:30)  HR: 65 (22 @ 10:20) (59 - 89)  BP: 103/65 (22 @ 10:20) (102/58 - 119/71)  RR: 18 (22 @ 10:20) (18 - 18)  SpO2: 99% (22 @ 10:20) (97% - 100%)  Wt(kg): --    PHYSICAL EXAM:    GENERAL: NAD, ill appearing chronic   HEAD:  Atraumatic, Normocephalic  EYES: EOMI, PERRL, conjunctiva and sclera clear  ENMT:  Moist mucous membranes,  No lesions  NECK: Supple,  NERVOUS SYSTEM:  Alert & Oriented X3,  Moves upper and lower extremities; CNS-II-XII  CHEST/LUNG: Clear to auscultation bilaterally; No rales, rhonchi, wheezing,   HEART: Regular rate and rhythm; No murmurs,   ABDOMEN: Soft, Nontender, Nondistended; Bowel sounds present  EXTREMITIES:  Peripheral Pulses, No  cyanosis, or edema  SKIN: No rashes or lesions  psychiatry- mood and affect flat    acetaminophen     Tablet .. 650 milliGRAM(s) Oral every 6 hours PRN  aluminum hydroxide/magnesium hydroxide/simethicone Suspension 30 milliLiter(s) Oral every 4 hours PRN  bictegravir 50 mG/emtricitabine 200 mG/tenofovir alafenamide 25 mG (BIKTARVY) 1 Tablet(s) Oral daily  cefTRIAXone   IVPB 1000 milliGRAM(s) IV Intermittent every 24 hours  heparin   Injectable 5000 Unit(s) SubCutaneous every 8 hours  melatonin 3 milliGRAM(s) Oral at bedtime PRN  ondansetron Injectable 4 milliGRAM(s) IV Push every 8 hours PRN      LABS:                          10.8   11.83 )-----------( 450      ( 2022 07:07 )             33.8     07-    136  |  100  |  10.4  ----------------------------<  126<H>  4.0   |  22.0  |  0.94    Ca    8.3<L>      2022 07:05    TPro  7.1  /  Alb  2.9<L>  /  TBili  <0.2<L>  /  DBili  x   /  AST  82<H>  /  ALT  93<H>  /  AlkPhos  146<H>  07-09    LIVER FUNCTIONS - ( 2022 07:05 )  Alb: 2.9 g/dL / Pro: 7.1 g/dL / ALK PHOS: 146 U/L / ALT: 93 U/L / AST: 82 U/L / GGT: x           PT/INR - ( 2022 01:04 )   PT: 13.8 sec;   INR: 1.19 ratio         PTT - ( 2022 01:04 )  PTT:30.3 sec      Urinalysis Basic - ( 2022 01:04 )    Color: Yellow / Appearance: Clear / S.010 / pH: x  Gluc: x / Ketone: Negative  / Bili: Negative / Urobili: Negative mg/dL   Blood: x / Protein: 15 / Nitrite: Negative   Leuk Esterase: Moderate / RBC: 0-2 /HPF / WBC 26-50 /HPF   Sq Epi: x / Non Sq Epi: Few / Bacteria: Occasional      CAPILLARY BLOOD GLUCOSE          RADIOLOGY & ADDITIONAL TESTS:      Consultant notes reviewed    Case discussed with consultant/provider/ family /patient

## 2022-07-10 VITALS
SYSTOLIC BLOOD PRESSURE: 117 MMHG | RESPIRATION RATE: 18 BRPM | HEART RATE: 84 BPM | TEMPERATURE: 98 F | DIASTOLIC BLOOD PRESSURE: 65 MMHG | OXYGEN SATURATION: 96 %

## 2022-07-10 LAB
-  AMIKACIN: SIGNIFICANT CHANGE UP
-  AMPICILLIN/SULBACTAM: SIGNIFICANT CHANGE UP
-  AMPICILLIN: SIGNIFICANT CHANGE UP
-  AZTREONAM: SIGNIFICANT CHANGE UP
-  CEFAZOLIN: SIGNIFICANT CHANGE UP
-  CEFEPIME: SIGNIFICANT CHANGE UP
-  CEFOXITIN: SIGNIFICANT CHANGE UP
-  CEFTRIAXONE: SIGNIFICANT CHANGE UP
-  CIPROFLOXACIN: SIGNIFICANT CHANGE UP
-  ERTAPENEM: SIGNIFICANT CHANGE UP
-  GENTAMICIN: SIGNIFICANT CHANGE UP
-  IMIPENEM: SIGNIFICANT CHANGE UP
-  LEVOFLOXACIN: SIGNIFICANT CHANGE UP
-  MEROPENEM: SIGNIFICANT CHANGE UP
-  PIPERACILLIN/TAZOBACTAM: SIGNIFICANT CHANGE UP
-  TOBRAMYCIN: SIGNIFICANT CHANGE UP
-  TRIMETHOPRIM/SULFAMETHOXAZOLE: SIGNIFICANT CHANGE UP
CULTURE RESULTS: SIGNIFICANT CHANGE UP
METHOD TYPE: SIGNIFICANT CHANGE UP
ORGANISM # SPEC MICROSCOPIC CNT: SIGNIFICANT CHANGE UP
SPECIMEN SOURCE: SIGNIFICANT CHANGE UP

## 2022-07-10 PROCEDURE — 85025 COMPLETE CBC W/AUTO DIFF WBC: CPT

## 2022-07-10 PROCEDURE — 81001 URINALYSIS AUTO W/SCOPE: CPT

## 2022-07-10 PROCEDURE — 71045 X-RAY EXAM CHEST 1 VIEW: CPT

## 2022-07-10 PROCEDURE — 87040 BLOOD CULTURE FOR BACTERIA: CPT

## 2022-07-10 PROCEDURE — 96375 TX/PRO/DX INJ NEW DRUG ADDON: CPT

## 2022-07-10 PROCEDURE — 80061 LIPID PANEL: CPT

## 2022-07-10 PROCEDURE — 85014 HEMATOCRIT: CPT

## 2022-07-10 PROCEDURE — 84295 ASSAY OF SERUM SODIUM: CPT

## 2022-07-10 PROCEDURE — 87637 SARSCOV2&INF A&B&RSV AMP PRB: CPT

## 2022-07-10 PROCEDURE — 86780 TREPONEMA PALLIDUM: CPT

## 2022-07-10 PROCEDURE — 85610 PROTHROMBIN TIME: CPT

## 2022-07-10 PROCEDURE — 87150 DNA/RNA AMPLIFIED PROBE: CPT

## 2022-07-10 PROCEDURE — 80053 COMPREHEN METABOLIC PANEL: CPT

## 2022-07-10 PROCEDURE — 83036 HEMOGLOBIN GLYCOSYLATED A1C: CPT

## 2022-07-10 PROCEDURE — 76705 ECHO EXAM OF ABDOMEN: CPT

## 2022-07-10 PROCEDURE — 99239 HOSP IP/OBS DSCHRG MGMT >30: CPT

## 2022-07-10 PROCEDURE — 82728 ASSAY OF FERRITIN: CPT

## 2022-07-10 PROCEDURE — 87591 N.GONORRHOEAE DNA AMP PROB: CPT

## 2022-07-10 PROCEDURE — 96365 THER/PROPH/DIAG IV INF INIT: CPT

## 2022-07-10 PROCEDURE — 86360 T CELL ABSOLUTE COUNT/RATIO: CPT

## 2022-07-10 PROCEDURE — 84443 ASSAY THYROID STIM HORMONE: CPT

## 2022-07-10 PROCEDURE — 85018 HEMOGLOBIN: CPT

## 2022-07-10 PROCEDURE — 93005 ELECTROCARDIOGRAM TRACING: CPT

## 2022-07-10 PROCEDURE — 87186 SC STD MICRODIL/AGAR DIL: CPT

## 2022-07-10 PROCEDURE — 74177 CT ABD & PELVIS W/CONTRAST: CPT | Mod: MG

## 2022-07-10 PROCEDURE — 84466 ASSAY OF TRANSFERRIN: CPT

## 2022-07-10 PROCEDURE — 85730 THROMBOPLASTIN TIME PARTIAL: CPT

## 2022-07-10 PROCEDURE — 87536 HIV-1 QUANT&REVRSE TRNSCRPJ: CPT

## 2022-07-10 PROCEDURE — 83605 ASSAY OF LACTIC ACID: CPT

## 2022-07-10 PROCEDURE — 87491 CHLMYD TRACH DNA AMP PROBE: CPT

## 2022-07-10 PROCEDURE — 84132 ASSAY OF SERUM POTASSIUM: CPT

## 2022-07-10 PROCEDURE — 96376 TX/PRO/DX INJ SAME DRUG ADON: CPT

## 2022-07-10 PROCEDURE — 82947 ASSAY GLUCOSE BLOOD QUANT: CPT

## 2022-07-10 PROCEDURE — 82435 ASSAY OF BLOOD CHLORIDE: CPT

## 2022-07-10 PROCEDURE — 86359 T CELLS TOTAL COUNT: CPT

## 2022-07-10 PROCEDURE — 87086 URINE CULTURE/COLONY COUNT: CPT

## 2022-07-10 PROCEDURE — 36415 COLL VENOUS BLD VENIPUNCTURE: CPT

## 2022-07-10 PROCEDURE — 82607 VITAMIN B-12: CPT

## 2022-07-10 PROCEDURE — 83540 ASSAY OF IRON: CPT

## 2022-07-10 PROCEDURE — 82330 ASSAY OF CALCIUM: CPT

## 2022-07-10 PROCEDURE — 96366 THER/PROPH/DIAG IV INF ADDON: CPT

## 2022-07-10 PROCEDURE — 82746 ASSAY OF FOLIC ACID SERUM: CPT

## 2022-07-10 PROCEDURE — 0241U: CPT

## 2022-07-10 PROCEDURE — 99285 EMERGENCY DEPT VISIT HI MDM: CPT

## 2022-07-10 PROCEDURE — 82803 BLOOD GASES ANY COMBINATION: CPT

## 2022-07-10 PROCEDURE — 80307 DRUG TEST PRSMV CHEM ANLYZR: CPT

## 2022-07-10 PROCEDURE — G1004: CPT

## 2022-07-10 PROCEDURE — 83550 IRON BINDING TEST: CPT

## 2022-07-10 RX ORDER — FERROUS SULFATE 325(65) MG
325 TABLET ORAL DAILY
Refills: 0 | Status: DISCONTINUED | OUTPATIENT
Start: 2022-07-10 | End: 2022-07-10

## 2022-07-10 RX ORDER — FERROUS SULFATE 325(65) MG
1 TABLET ORAL
Qty: 0 | Refills: 0 | DISCHARGE
Start: 2022-07-10

## 2022-07-10 RX ORDER — ASCORBIC ACID 60 MG
500 TABLET,CHEWABLE ORAL DAILY
Refills: 0 | Status: DISCONTINUED | OUTPATIENT
Start: 2022-07-10 | End: 2022-07-10

## 2022-07-10 RX ORDER — ASCORBIC ACID 60 MG
1 TABLET,CHEWABLE ORAL
Qty: 0 | Refills: 0 | DISCHARGE
Start: 2022-07-10

## 2022-07-10 RX ADMIN — HEPARIN SODIUM 5000 UNIT(S): 5000 INJECTION INTRAVENOUS; SUBCUTANEOUS at 06:15

## 2022-07-10 NOTE — DISCHARGE NOTE PROVIDER - NSDCMRMEDTOKEN_GEN_ALL_CORE_FT
ascorbic acid 500 mg oral tablet: 1 tab(s) orally once a day  Biktarvy 50 mg-200 mg-25 mg oral tablet: 1 tab(s) orally once a day  ferrous sulfate 325 mg (65 mg elemental iron) oral tablet: 1 tab(s) orally once a day  Levaquin 750 mg oral tablet: 1 tab(s) orally every 24 hours

## 2022-07-10 NOTE — DISCHARGE NOTE PROVIDER - HOSPITAL COURSE
37 yo male with hx of HIV admitted with pyelonephritis.   pt left AMA and I was made aware after the fact.  I called him to ask for his pharmacy details so I can send antibx scripts.   He did not know his pharmacy.    TIME 35 mins

## 2022-07-10 NOTE — DISCHARGE NOTE PROVIDER - ATTENDING DISCHARGE PHYSICAL EXAMINATION:
Vital Signs Last 24 Hrs  T(C): 36.9 (07-10-22 @ 04:33), Max: 37.2 (07-09-22 @ 17:59)  T(F): 98.4 (07-10-22 @ 04:33), Max: 99 (07-09-22 @ 17:59)  HR: 84 (07-10-22 @ 04:33) (80 - 84)  BP: 117/65 (07-10-22 @ 04:33) (85/44 - 117/65)  BP(mean): --  RR: 18 (07-10-22 @ 04:33) (18 - 18)  SpO2: 96% (07-10-22 @ 04:33) (96% - 98%)    GENERAL: ill appearing chronic   HEAD:  Atraumatic, Normocephalic  EYES: EOMI, PERRL, conjunctiva and sclera clear  ENMT:  Moist mucous membranes,  No lesions  NECK: Supple,  NERVOUS SYSTEM:  Alert & Oriented X3,  Moves upper and lower extremities; CNS-II-XII  CHEST/LUNG: Clear to auscultation bilaterally; No rales, rhonchi, wheezing,   HEART: Regular rate and rhythm; No murmurs,   ABDOMEN: Soft, Nontender, Nondistended; Bowel sounds present  EXTREMITIES:  Peripheral Pulses, No  cyanosis, or edema  SKIN: No rashes or lesions  psychiatry- mood and affect flat

## 2022-07-10 NOTE — DISCHARGE NOTE PROVIDER - NSDCCPCAREPLAN_GEN_ALL_CORE_FT
PRINCIPAL DISCHARGE DIAGNOSIS  Diagnosis: Pyelonephritis  Assessment and Plan of Treatment:       SECONDARY DISCHARGE DIAGNOSES  Diagnosis: Acute UTI  Assessment and Plan of Treatment:     Diagnosis: HIV disease  Assessment and Plan of Treatment:

## 2022-07-13 LAB
CULTURE RESULTS: SIGNIFICANT CHANGE UP
SPECIMEN SOURCE: SIGNIFICANT CHANGE UP